# Patient Record
Sex: FEMALE | Race: BLACK OR AFRICAN AMERICAN | NOT HISPANIC OR LATINO | URBAN - METROPOLITAN AREA
[De-identification: names, ages, dates, MRNs, and addresses within clinical notes are randomized per-mention and may not be internally consistent; named-entity substitution may affect disease eponyms.]

---
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
## 2019-01-08 ENCOUNTER — APPOINTMENT (OUTPATIENT)
Dept: OTOLARYNGOLOGY | Facility: CLINIC | Age: 19
End: 2019-01-08
Payer: MEDICAID

## 2019-01-08 VITALS
SYSTOLIC BLOOD PRESSURE: 118 MMHG | HEART RATE: 84 BPM | BODY MASS INDEX: 23.19 KG/M2 | WEIGHT: 126 LBS | DIASTOLIC BLOOD PRESSURE: 79 MMHG | OXYGEN SATURATION: 98 % | HEIGHT: 62 IN

## 2019-01-08 PROCEDURE — 99214 OFFICE O/P EST MOD 30 MIN: CPT

## 2019-01-08 NOTE — HISTORY OF PRESENT ILLNESS
[de-identified] : Seen at ERs several times recently for what she reports was tonsillitis with extremely swollen tonsils. Hx of having had mono a year ago with a similar presentation. Now c/o feeling the tonsils touching. No pain now. \par Snores with c/o ongoing worsening daytime sleepiness, mouth breathing and dry mouth. In general not a lot of nasal dyspnea. Morning headaches. \par Also c/o occasional tonsilliths (roughly monthly); hasn't tried a water pik.

## 2019-01-08 NOTE — ASSESSMENT
[FreeTextEntry1] : Likely LIZET; discussed possible tonsillectomy/lingual tonsillectomy +/- adenoids but will obtain PSG first.

## 2019-01-30 ENCOUNTER — OUTPATIENT (OUTPATIENT)
Dept: OUTPATIENT SERVICES | Facility: HOSPITAL | Age: 19
LOS: 1 days | End: 2019-01-30
Payer: COMMERCIAL

## 2019-01-30 ENCOUNTER — APPOINTMENT (OUTPATIENT)
Dept: SLEEP CENTER | Facility: HOSPITAL | Age: 19
End: 2019-01-30

## 2019-01-30 DIAGNOSIS — G47.33 OBSTRUCTIVE SLEEP APNEA (ADULT) (PEDIATRIC): ICD-10-CM

## 2019-01-30 PROCEDURE — 95810 POLYSOM 6/> YRS 4/> PARAM: CPT

## 2019-01-30 PROCEDURE — 95810 POLYSOM 6/> YRS 4/> PARAM: CPT | Mod: 26

## 2019-02-11 ENCOUNTER — EMERGENCY (EMERGENCY)
Facility: HOSPITAL | Age: 19
LOS: 1 days | Discharge: HOME | End: 2019-02-11
Attending: EMERGENCY MEDICINE | Admitting: EMERGENCY MEDICINE
Payer: MEDICAID

## 2019-02-11 VITALS
TEMPERATURE: 98 F | DIASTOLIC BLOOD PRESSURE: 77 MMHG | HEART RATE: 94 BPM | OXYGEN SATURATION: 100 % | SYSTOLIC BLOOD PRESSURE: 122 MMHG | RESPIRATION RATE: 16 BRPM

## 2019-02-11 PROCEDURE — 99284 EMERGENCY DEPT VISIT MOD MDM: CPT | Mod: 25

## 2019-02-11 NOTE — ED ADULT TRIAGE NOTE - CHIEF COMPLAINT QUOTE
pt c/o generalized abdominal pain/cramping x3 days beginning after eating chipotle- denies any n/v/d, urinary, last BM this morning, appears comfortable in NAD pt c/o generalized abdominal pain/cramping x3 days beginning after eating chipotle- denies any n/v/d, urinary, last BM this morning, currently menstruating, appears comfortable in NAD

## 2019-02-12 LAB
ALBUMIN SERPL ELPH-MCNC: 4.3 G/DL — SIGNIFICANT CHANGE UP (ref 3.3–5)
ALP SERPL-CCNC: 54 U/L — SIGNIFICANT CHANGE UP (ref 40–120)
ALT FLD-CCNC: 11 U/L — SIGNIFICANT CHANGE UP (ref 4–33)
ANION GAP SERPL CALC-SCNC: 13 MMO/L — SIGNIFICANT CHANGE UP (ref 7–14)
AST SERPL-CCNC: 16 U/L — SIGNIFICANT CHANGE UP (ref 4–32)
BASOPHILS # BLD AUTO: 0.05 K/UL — SIGNIFICANT CHANGE UP (ref 0–0.2)
BASOPHILS NFR BLD AUTO: 0.6 % — SIGNIFICANT CHANGE UP (ref 0–2)
BILIRUB SERPL-MCNC: 0.4 MG/DL — SIGNIFICANT CHANGE UP (ref 0.2–1.2)
BUN SERPL-MCNC: 7 MG/DL — SIGNIFICANT CHANGE UP (ref 7–23)
CALCIUM SERPL-MCNC: 9.3 MG/DL — SIGNIFICANT CHANGE UP (ref 8.4–10.5)
CHLORIDE SERPL-SCNC: 101 MMOL/L — SIGNIFICANT CHANGE UP (ref 98–107)
CO2 SERPL-SCNC: 24 MMOL/L — SIGNIFICANT CHANGE UP (ref 22–31)
CREAT SERPL-MCNC: 0.79 MG/DL — SIGNIFICANT CHANGE UP (ref 0.5–1.3)
EOSINOPHIL # BLD AUTO: 0.09 K/UL — SIGNIFICANT CHANGE UP (ref 0–0.5)
EOSINOPHIL NFR BLD AUTO: 1.1 % — SIGNIFICANT CHANGE UP (ref 0–6)
GLUCOSE SERPL-MCNC: 100 MG/DL — HIGH (ref 70–99)
HCT VFR BLD CALC: 40.7 % — SIGNIFICANT CHANGE UP (ref 34.5–45)
HGB BLD-MCNC: 12.7 G/DL — SIGNIFICANT CHANGE UP (ref 11.5–15.5)
IMM GRANULOCYTES NFR BLD AUTO: 0.2 % — SIGNIFICANT CHANGE UP (ref 0–1.5)
LIDOCAIN IGE QN: 26.4 U/L — SIGNIFICANT CHANGE UP (ref 7–60)
LYMPHOCYTES # BLD AUTO: 3.71 K/UL — HIGH (ref 1–3.3)
LYMPHOCYTES # BLD AUTO: 45.1 % — HIGH (ref 13–44)
MCHC RBC-ENTMCNC: 27.4 PG — SIGNIFICANT CHANGE UP (ref 27–34)
MCHC RBC-ENTMCNC: 31.2 % — LOW (ref 32–36)
MCV RBC AUTO: 87.9 FL — SIGNIFICANT CHANGE UP (ref 80–100)
MONOCYTES # BLD AUTO: 0.73 K/UL — SIGNIFICANT CHANGE UP (ref 0–0.9)
MONOCYTES NFR BLD AUTO: 8.9 % — SIGNIFICANT CHANGE UP (ref 2–14)
NEUTROPHILS # BLD AUTO: 3.62 K/UL — SIGNIFICANT CHANGE UP (ref 1.8–7.4)
NEUTROPHILS NFR BLD AUTO: 44.1 % — SIGNIFICANT CHANGE UP (ref 43–77)
NRBC # FLD: 0 K/UL — LOW (ref 25–125)
PLATELET # BLD AUTO: 297 K/UL — SIGNIFICANT CHANGE UP (ref 150–400)
PMV BLD: 11.6 FL — SIGNIFICANT CHANGE UP (ref 7–13)
POTASSIUM SERPL-MCNC: 4.1 MMOL/L — SIGNIFICANT CHANGE UP (ref 3.5–5.3)
POTASSIUM SERPL-SCNC: 4.1 MMOL/L — SIGNIFICANT CHANGE UP (ref 3.5–5.3)
PROT SERPL-MCNC: 7.3 G/DL — SIGNIFICANT CHANGE UP (ref 6–8.3)
RBC # BLD: 4.63 M/UL — SIGNIFICANT CHANGE UP (ref 3.8–5.2)
RBC # FLD: 12.1 % — SIGNIFICANT CHANGE UP (ref 10.3–14.5)
SODIUM SERPL-SCNC: 138 MMOL/L — SIGNIFICANT CHANGE UP (ref 135–145)
WBC # BLD: 8.22 K/UL — SIGNIFICANT CHANGE UP (ref 3.8–10.5)
WBC # FLD AUTO: 8.22 K/UL — SIGNIFICANT CHANGE UP (ref 3.8–10.5)

## 2019-02-12 RX ORDER — ACETAMINOPHEN 500 MG
650 TABLET ORAL ONCE
Qty: 0 | Refills: 0 | Status: COMPLETED | OUTPATIENT
Start: 2019-02-12 | End: 2019-02-12

## 2019-02-12 RX ORDER — SODIUM CHLORIDE 9 MG/ML
1000 INJECTION INTRAMUSCULAR; INTRAVENOUS; SUBCUTANEOUS ONCE
Qty: 0 | Refills: 0 | Status: COMPLETED | OUTPATIENT
Start: 2019-02-12 | End: 2019-02-12

## 2019-02-12 RX ORDER — FAMOTIDINE 10 MG/ML
20 INJECTION INTRAVENOUS ONCE
Qty: 0 | Refills: 0 | Status: COMPLETED | OUTPATIENT
Start: 2019-02-12 | End: 2019-02-12

## 2019-02-12 RX ADMIN — Medication 30 MILLILITER(S): at 03:31

## 2019-02-12 RX ADMIN — Medication 650 MILLIGRAM(S): at 03:31

## 2019-02-12 RX ADMIN — SODIUM CHLORIDE 1000 MILLILITER(S): 9 INJECTION INTRAMUSCULAR; INTRAVENOUS; SUBCUTANEOUS at 03:31

## 2019-02-12 RX ADMIN — FAMOTIDINE 20 MILLIGRAM(S): 10 INJECTION INTRAVENOUS at 03:31

## 2019-02-12 NOTE — ED PROVIDER NOTE - NSFOLLOWUPINSTRUCTIONS_ED_ALL_ED_FT
Can take tylenol every 6hrs as needed for fever/pain.  Stay well hydrated.  Return for persistent fever/vomit, uncontrolled pain, worsening breathing.   Follow up with primary doctor within 1-2 days.     Abdominal Pain    Many things can cause abdominal pain. Many times, abdominal pain is not caused by a disease and will improve without treatment. Your health care provider will do a physical exam to determine if there is a dangerous cause of your pain; blood tests and imaging may help determine the cause of your pain. However, in many cases, no cause may be found and you may need further testing as an outpatient. Monitor your abdominal pain for any changes.     SEEK IMMEDIATE MEDICAL CARE IF YOU HAVE ANY OF THE FOLLOWING SYMPTOMS: worsening abdominal pain, uncontrollable vomiting, profuse diarrhea, inability to have bowel movements or pass gas, black or bloody stools, fever accompanying chest pain or back pain, or fainting. These symptoms may represent a serious problem that is an emergency. Do not wait to see if the symptoms will go away. Get medical help right away. Call 911 and do not drive yourself to the hospital.

## 2019-02-12 NOTE — ED PROVIDER NOTE - PROGRESS NOTE DETAILS
Klepfish: ucg negative. Labs grossly wnl. Feeling much better. abd soft ntnd. Given copy of results, comfortable for dc, outpt pmd f/u.

## 2019-02-12 NOTE — ED PROVIDER NOTE - ATTENDING CONTRIBUTION TO CARE
18F no PMH p/w periumbilical pain, "twisting," x3d, intermittent, began shortly after eating chipotle. Minimal nausea and 1 episode diarrhea, now resolved. Went to pmd who recommended going to ER if pain persists. PT still has intermittent pain (though currently asymptomatic) so came to ED. No other systemic symptoms. Vitals wnl, exam as above.

## 2019-02-12 NOTE — ED PROVIDER NOTE - OBJECTIVE STATEMENT
18F no PMH p/w periumbilical pain, "twisting," x3d, intermittent, began shortly after eating chipotle. Minimal nausea and 1 episode diarrhea, now resolved. Went to pmd who recommended going to ER if pain persists. PT still has intermittent pain (though currently asymptomatic) so came to ED. Denies vomiting, HA, rashes, SOB/CP, black/bloody stool, LE pain/swelling, recent travel, sick contacts. HAs not taken any pain meds.   LMP ~1.5w ago

## 2019-02-12 NOTE — ED PROVIDER NOTE - MEDICAL DECISION MAKING DETAILS
18F no PMH p/w periumbilical pain, "twisting," x3d, intermittent, began shortly after eating chipotle. Minimal nausea and 1 episode diarrhea, now resolved. Went to pmd who recommended going to ER if pain persists. PT still has intermittent pain (though currently asymptomatic) so came to ED. No other systemic symptoms. Vitals wnl, exam as above.   ddx: Clinically benign abd. Likely GERD/gastritis, less likely pancreatitis  CBC, cmp, lipase, ucg, symptom control, reassess.

## 2019-03-13 ENCOUNTER — APPOINTMENT (OUTPATIENT)
Dept: OTOLARYNGOLOGY | Facility: CLINIC | Age: 19
End: 2019-03-13
Payer: MEDICAID

## 2019-03-13 VITALS
BODY MASS INDEX: 23.19 KG/M2 | OXYGEN SATURATION: 100 % | DIASTOLIC BLOOD PRESSURE: 81 MMHG | SYSTOLIC BLOOD PRESSURE: 133 MMHG | HEART RATE: 88 BPM | TEMPERATURE: 98.3 F | WEIGHT: 126 LBS | HEIGHT: 62 IN

## 2019-03-13 DIAGNOSIS — J35.1 HYPERTROPHY OF TONSILS: ICD-10-CM

## 2019-03-13 PROCEDURE — 99214 OFFICE O/P EST MOD 30 MIN: CPT

## 2019-03-13 NOTE — HISTORY OF PRESENT ILLNESS
[de-identified] : Seen at ERs several times recently for what she reports was tonsillitis with extremely swollen tonsils; this continues to bother her while eating. \par Snores with c/o ongoing worsening daytime sleepiness, mouth breathing and dry mouth. In general not a lot of nasal dyspnea. Morning headaches. Now follows up a negative PSG. \par Also c/o occasional tonsilliths (roughly monthly); hasn't tried a water pik.

## 2019-03-13 NOTE — ASSESSMENT
[FreeTextEntry1] : Discussed her lack of LIZET on PSG; her indication for a tonsillectomy would be the tonsilliths, but she didn't try a water pik yet and agrees to do this and RTC.

## 2019-03-16 ENCOUNTER — EMERGENCY (EMERGENCY)
Facility: HOSPITAL | Age: 19
LOS: 1 days | Discharge: HOME | End: 2019-03-16
Admitting: EMERGENCY MEDICINE
Payer: MEDICAID

## 2019-03-16 VITALS
SYSTOLIC BLOOD PRESSURE: 110 MMHG | HEART RATE: 117 BPM | TEMPERATURE: 100 F | OXYGEN SATURATION: 100 % | RESPIRATION RATE: 18 BRPM | DIASTOLIC BLOOD PRESSURE: 82 MMHG

## 2019-03-16 VITALS
HEART RATE: 89 BPM | DIASTOLIC BLOOD PRESSURE: 58 MMHG | OXYGEN SATURATION: 100 % | SYSTOLIC BLOOD PRESSURE: 105 MMHG | RESPIRATION RATE: 18 BRPM

## 2019-03-16 LAB
ALBUMIN SERPL ELPH-MCNC: 4.8 G/DL — SIGNIFICANT CHANGE UP (ref 3.3–5)
ALP SERPL-CCNC: 70 U/L — SIGNIFICANT CHANGE UP (ref 40–120)
ALT FLD-CCNC: 13 U/L — SIGNIFICANT CHANGE UP (ref 4–33)
ANION GAP SERPL CALC-SCNC: 12 MMO/L — SIGNIFICANT CHANGE UP (ref 7–14)
AST SERPL-CCNC: 19 U/L — SIGNIFICANT CHANGE UP (ref 4–32)
BASOPHILS # BLD AUTO: 0.06 K/UL — SIGNIFICANT CHANGE UP (ref 0–0.2)
BASOPHILS NFR BLD AUTO: 0.5 % — SIGNIFICANT CHANGE UP (ref 0–2)
BILIRUB SERPL-MCNC: 0.5 MG/DL — SIGNIFICANT CHANGE UP (ref 0.2–1.2)
BUN SERPL-MCNC: 7 MG/DL — SIGNIFICANT CHANGE UP (ref 7–23)
CALCIUM SERPL-MCNC: 10 MG/DL — SIGNIFICANT CHANGE UP (ref 8.4–10.5)
CHLORIDE SERPL-SCNC: 100 MMOL/L — SIGNIFICANT CHANGE UP (ref 98–107)
CO2 SERPL-SCNC: 24 MMOL/L — SIGNIFICANT CHANGE UP (ref 22–31)
CREAT SERPL-MCNC: 0.83 MG/DL — SIGNIFICANT CHANGE UP (ref 0.5–1.3)
EOSINOPHIL # BLD AUTO: 0.07 K/UL — SIGNIFICANT CHANGE UP (ref 0–0.5)
EOSINOPHIL NFR BLD AUTO: 0.6 % — SIGNIFICANT CHANGE UP (ref 0–6)
FLU A RESULT: NOT DETECTED — SIGNIFICANT CHANGE UP
FLU A RESULT: NOT DETECTED — SIGNIFICANT CHANGE UP
FLUAV AG NPH QL: NOT DETECTED — SIGNIFICANT CHANGE UP
FLUBV AG NPH QL: NOT DETECTED — SIGNIFICANT CHANGE UP
GLUCOSE SERPL-MCNC: 94 MG/DL — SIGNIFICANT CHANGE UP (ref 70–99)
HCT VFR BLD CALC: 44.2 % — SIGNIFICANT CHANGE UP (ref 34.5–45)
HGB BLD-MCNC: 13.9 G/DL — SIGNIFICANT CHANGE UP (ref 11.5–15.5)
IMM GRANULOCYTES NFR BLD AUTO: 0.3 % — SIGNIFICANT CHANGE UP (ref 0–1.5)
LYMPHOCYTES # BLD AUTO: 1.2 K/UL — SIGNIFICANT CHANGE UP (ref 1–3.3)
LYMPHOCYTES # BLD AUTO: 10.7 % — LOW (ref 13–44)
MCHC RBC-ENTMCNC: 28 PG — SIGNIFICANT CHANGE UP (ref 27–34)
MCHC RBC-ENTMCNC: 31.4 % — LOW (ref 32–36)
MCV RBC AUTO: 88.9 FL — SIGNIFICANT CHANGE UP (ref 80–100)
MONOCYTES # BLD AUTO: 0.76 K/UL — SIGNIFICANT CHANGE UP (ref 0–0.9)
MONOCYTES NFR BLD AUTO: 6.8 % — SIGNIFICANT CHANGE UP (ref 2–14)
NEUTROPHILS # BLD AUTO: 9.11 K/UL — HIGH (ref 1.8–7.4)
NEUTROPHILS NFR BLD AUTO: 81.1 % — HIGH (ref 43–77)
NRBC # FLD: 0 K/UL — LOW (ref 25–125)
PLATELET # BLD AUTO: 282 K/UL — SIGNIFICANT CHANGE UP (ref 150–400)
PMV BLD: 12 FL — SIGNIFICANT CHANGE UP (ref 7–13)
POTASSIUM SERPL-MCNC: 4.4 MMOL/L — SIGNIFICANT CHANGE UP (ref 3.5–5.3)
POTASSIUM SERPL-SCNC: 4.4 MMOL/L — SIGNIFICANT CHANGE UP (ref 3.5–5.3)
PROT SERPL-MCNC: 8.2 G/DL — SIGNIFICANT CHANGE UP (ref 6–8.3)
RBC # BLD: 4.97 M/UL — SIGNIFICANT CHANGE UP (ref 3.8–5.2)
RBC # FLD: 12.3 % — SIGNIFICANT CHANGE UP (ref 10.3–14.5)
RSV RESULT: SIGNIFICANT CHANGE UP
RSV RNA RESP QL NAA+PROBE: SIGNIFICANT CHANGE UP
SODIUM SERPL-SCNC: 136 MMOL/L — SIGNIFICANT CHANGE UP (ref 135–145)
WBC # BLD: 11.23 K/UL — HIGH (ref 3.8–10.5)
WBC # FLD AUTO: 11.23 K/UL — HIGH (ref 3.8–10.5)

## 2019-03-16 PROCEDURE — 71046 X-RAY EXAM CHEST 2 VIEWS: CPT | Mod: 26

## 2019-03-16 PROCEDURE — 99284 EMERGENCY DEPT VISIT MOD MDM: CPT

## 2019-03-16 RX ORDER — ACETAMINOPHEN 500 MG
650 TABLET ORAL ONCE
Qty: 0 | Refills: 0 | Status: COMPLETED | OUTPATIENT
Start: 2019-03-16 | End: 2019-03-16

## 2019-03-16 RX ORDER — AMOXICILLIN 250 MG/5ML
1 SUSPENSION, RECONSTITUTED, ORAL (ML) ORAL
Qty: 14 | Refills: 0 | OUTPATIENT
Start: 2019-03-16 | End: 2019-03-22

## 2019-03-16 RX ORDER — SODIUM CHLORIDE 9 MG/ML
1000 INJECTION INTRAMUSCULAR; INTRAVENOUS; SUBCUTANEOUS ONCE
Qty: 0 | Refills: 0 | Status: COMPLETED | OUTPATIENT
Start: 2019-03-16 | End: 2019-03-16

## 2019-03-16 RX ORDER — KETOROLAC TROMETHAMINE 30 MG/ML
15 SYRINGE (ML) INJECTION ONCE
Qty: 0 | Refills: 0 | Status: DISCONTINUED | OUTPATIENT
Start: 2019-03-16 | End: 2019-03-16

## 2019-03-16 RX ORDER — DEXAMETHASONE 0.5 MG/5ML
10 ELIXIR ORAL ONCE
Qty: 0 | Refills: 0 | Status: COMPLETED | OUTPATIENT
Start: 2019-03-16 | End: 2019-03-16

## 2019-03-16 RX ADMIN — Medication 102 MILLIGRAM(S): at 15:05

## 2019-03-16 RX ADMIN — Medication 100 MILLIGRAM(S): at 15:06

## 2019-03-16 RX ADMIN — SODIUM CHLORIDE 1000 MILLILITER(S): 9 INJECTION INTRAMUSCULAR; INTRAVENOUS; SUBCUTANEOUS at 15:07

## 2019-03-16 RX ADMIN — SODIUM CHLORIDE 1000 MILLILITER(S): 9 INJECTION INTRAMUSCULAR; INTRAVENOUS; SUBCUTANEOUS at 17:33

## 2019-03-16 RX ADMIN — Medication 650 MILLIGRAM(S): at 15:06

## 2019-03-16 RX ADMIN — Medication 650 MILLIGRAM(S): at 15:04

## 2019-03-16 RX ADMIN — SODIUM CHLORIDE 1000 MILLILITER(S): 9 INJECTION INTRAMUSCULAR; INTRAVENOUS; SUBCUTANEOUS at 15:06

## 2019-03-16 RX ADMIN — Medication 10 MILLIGRAM(S): at 15:07

## 2019-03-16 RX ADMIN — Medication 100 MILLIGRAM(S): at 17:23

## 2019-03-16 NOTE — ED PROVIDER NOTE - PLAN OF CARE
Seen in ED for cough, likely viral upper respiratory tract infection.  Please drink plenty of fluids and follow the instructions below:   1)	If you have fever greater than 100F or generalized bodyaches then please take Tylenol 650 mg every 4 hours and Motrin 600 mg every 6 hours.   2)	If you have head congestion, sinus pressure, or nasal congestion then please take Allegra 120-180 mg every 24 hours or Zyrtec 10 mg every 24 hours.    3)	If you have throat discomfort please take Cepacol or Chloraseptic spray. Check bottle to make sure alcohol free.   4)	If you have persistent dry cough please take Delsym or Robitussin.   5)	If you have difficulty bringing up mucus please take Mucinex use as directed.   6)	Make a follow-up appointment with your primary doctor.   7)	Return to ED for any new or worsening symptoms.     Take Amoxicillin as prescribed.

## 2019-03-16 NOTE — ED PROVIDER NOTE - CARE PLAN
Principal Discharge DX:	Viral syndrome  Secondary Diagnosis:	Tonsillitis Principal Discharge DX:	Viral syndrome  Assessment and plan of treatment:	Seen in ED for cough, likely viral upper respiratory tract infection.  Please drink plenty of fluids and follow the instructions below:   1)	If you have fever greater than 100F or generalized bodyaches then please take Tylenol 650 mg every 4 hours and Motrin 600 mg every 6 hours.   2)	If you have head congestion, sinus pressure, or nasal congestion then please take Allegra 120-180 mg every 24 hours or Zyrtec 10 mg every 24 hours.    3)	If you have throat discomfort please take Cepacol or Chloraseptic spray. Check bottle to make sure alcohol free.   4)	If you have persistent dry cough please take Delsym or Robitussin.   5)	If you have difficulty bringing up mucus please take Mucinex use as directed.   6)	Make a follow-up appointment with your primary doctor.   7)	Return to ED for any new or worsening symptoms.     Take Amoxicillin as prescribed.  Secondary Diagnosis:	Tonsillitis

## 2019-03-16 NOTE — ED ADULT TRIAGE NOTE - CHIEF COMPLAINT QUOTE
pt amb to triage c/o sore throat and cough since yesterday associated w/ subjective fever/chills and body aches, denies OTC pain/cough meds prior to arrival, denies receiving flu vaccine this season, couhg noted on presentation, able to complete sentences

## 2019-03-16 NOTE — ED PROVIDER NOTE - OBJECTIVE STATEMENT
17 yo F with PMHX of recurrent tonsillitis presents to the ED today c/o sore throat, pain with swallowing, SOB, productive cough, nasal congestion, chest congestion, subjective fever and chills, that began yesterday. Denies nausea, vomiting, sob, abdominal pain, urinary symptoms, weakness, rashes.   Non smoker.

## 2019-03-16 NOTE — ED PROVIDER NOTE - THROAT FINDINGS
NO DROOLING/B\L tonsillar swelling, enlarged but non kissing, no exudates,or vesicles./THROAT RED/no exudate/NO STRIDOR/TONSILLAR SWELLING/NO TONGUE ELEVATION/uvula midline/no vesicles

## 2019-03-16 NOTE — ED PROVIDER NOTE - PROGRESS NOTE DETAILS
Pt feels improved. reports still coughing. Will treat tonsillitis with amox for 7  days as it is recurrent. Vitals improved dc home .

## 2019-03-16 NOTE — ED PROVIDER NOTE - CLINICAL SUMMARY MEDICAL DECISION MAKING FREE TEXT BOX
17 yo F with viral syndrome and tonsillitis.  Plan for labs, flu, cxr, IVF, decadron, tylenol, reassess.

## 2019-04-10 ENCOUNTER — APPOINTMENT (OUTPATIENT)
Dept: OTOLARYNGOLOGY | Facility: CLINIC | Age: 19
End: 2019-04-10
Payer: MEDICAID

## 2019-04-10 VITALS
SYSTOLIC BLOOD PRESSURE: 120 MMHG | OXYGEN SATURATION: 100 % | HEIGHT: 62 IN | HEART RATE: 92 BPM | DIASTOLIC BLOOD PRESSURE: 79 MMHG | WEIGHT: 126 LBS | BODY MASS INDEX: 23.19 KG/M2 | TEMPERATURE: 98.7 F

## 2019-04-10 PROCEDURE — 99214 OFFICE O/P EST MOD 30 MIN: CPT

## 2019-04-10 NOTE — HISTORY OF PRESENT ILLNESS
[de-identified] : f/u for episodes of tonsillitis with extremely swollen tonsils; this continues to bother her while eating. After reviewing she and her GM feel that she has had three/year for many years. \par Snores with c/o ongoing worsening daytime sleepiness, mouth breathing and dry mouth. In general not a lot of nasal dyspnea. Morning headaches. Had a negative PSG. \par Also c/o occasional tonsilliths (roughly monthly).

## 2019-05-01 ENCOUNTER — APPOINTMENT (OUTPATIENT)
Dept: OTOLARYNGOLOGY | Facility: CLINIC | Age: 19
End: 2019-05-01
Payer: MEDICAID

## 2019-05-01 VITALS
TEMPERATURE: 98 F | HEART RATE: 99 BPM | SYSTOLIC BLOOD PRESSURE: 118 MMHG | BODY MASS INDEX: 23.19 KG/M2 | DIASTOLIC BLOOD PRESSURE: 77 MMHG | HEIGHT: 62 IN | WEIGHT: 126 LBS | OXYGEN SATURATION: 98 %

## 2019-05-01 PROCEDURE — 99215 OFFICE O/P EST HI 40 MIN: CPT

## 2019-05-01 RX ORDER — HYDROCODONE BITARTRATE AND ACETAMINOPHEN 5; 325 MG/1; MG/1
5-325 TABLET ORAL
Qty: 30 | Refills: 0 | Status: ACTIVE | COMMUNITY
Start: 2019-05-01 | End: 1900-01-01

## 2019-05-01 RX ORDER — AMOXICILLIN 400 MG/5ML
400 FOR SUSPENSION ORAL
Qty: 3 | Refills: 0 | Status: ACTIVE | COMMUNITY
Start: 2019-05-01 | End: 1900-01-01

## 2019-05-01 NOTE — HISTORY OF PRESENT ILLNESS
[de-identified] : f/u for episodes of tonsillitis with extremely swollen tonsils; this continues to bother her while eating. After reviewing she and her GM feel that she has had three/year for many years. \par Snores with c/o ongoing worsening daytime sleepiness, mouth breathing and dry mouth. In general not a lot of nasal dyspnea. Morning headaches. Had a negative PSG. \par Also c/o occasional tonsilliths (roughly monthly).

## 2019-05-01 NOTE — ASSESSMENT
[FreeTextEntry1] : Preop done for tonsillectomy. The risks and benefits were fully reviewed including but not limited to: postoperative hemorrhage; velopharyngeal insufficiency; swallowing or speaking problems; asymmetric appearing palate. The patient would like to proceed. An educational perioperative care handout was given.\par  check negative\par

## 2019-05-03 ENCOUNTER — RESULT REVIEW (OUTPATIENT)
Age: 19
End: 2019-05-03

## 2019-05-03 ENCOUNTER — APPOINTMENT (OUTPATIENT)
Dept: OTOLARYNGOLOGY | Facility: AMBULATORY SURGERY CENTER | Age: 19
End: 2019-05-03

## 2019-05-03 ENCOUNTER — OUTPATIENT (OUTPATIENT)
Dept: OUTPATIENT SERVICES | Facility: HOSPITAL | Age: 19
LOS: 1 days | Discharge: HOME | End: 2019-05-03
Payer: MEDICAID

## 2019-05-03 PROCEDURE — 42826 REMOVAL OF TONSILS: CPT

## 2019-05-11 LAB — SURGICAL PATHOLOGY STUDY: SIGNIFICANT CHANGE UP

## 2019-05-30 ENCOUNTER — APPOINTMENT (OUTPATIENT)
Dept: OTOLARYNGOLOGY | Facility: CLINIC | Age: 19
End: 2019-05-30
Payer: MEDICAID

## 2019-05-30 VITALS
WEIGHT: 126 LBS | HEIGHT: 62 IN | HEART RATE: 100 BPM | BODY MASS INDEX: 23.19 KG/M2 | TEMPERATURE: 98.4 F | SYSTOLIC BLOOD PRESSURE: 125 MMHG | OXYGEN SATURATION: 98 % | DIASTOLIC BLOOD PRESSURE: 80 MMHG

## 2019-05-30 DIAGNOSIS — Z87.09 PERSONAL HISTORY OF OTHER DISEASES OF THE RESPIRATORY SYSTEM: ICD-10-CM

## 2019-05-30 DIAGNOSIS — J35.1 HYPERTROPHY OF TONSILS: ICD-10-CM

## 2019-05-30 DIAGNOSIS — J35.8 OTHER CHRONIC DISEASES OF TONSILS AND ADENOIDS: ICD-10-CM

## 2019-05-30 DIAGNOSIS — Z87.898 PERSONAL HISTORY OF OTHER SPECIFIED CONDITIONS: ICD-10-CM

## 2019-05-30 PROCEDURE — 99024 POSTOP FOLLOW-UP VISIT: CPT

## 2019-06-03 PROBLEM — Z87.898 HISTORY OF SNORING: Status: RESOLVED | Noted: 2018-12-05 | Resolved: 2019-06-03

## 2019-06-03 PROBLEM — J35.8 TONSILLITH: Status: RESOLVED | Noted: 2018-12-05 | Resolved: 2019-06-03

## 2019-06-03 PROBLEM — J35.1 PALATINE TONSIL HYPERTROPHY: Status: RESOLVED | Noted: 2018-12-05 | Resolved: 2019-06-03

## 2019-06-03 PROBLEM — Z87.09 HISTORY OF RECURRENT TONSILLITIS: Status: RESOLVED | Noted: 2019-04-10 | Resolved: 2019-06-03

## 2020-09-08 NOTE — ED PEDIATRIC TRIAGE NOTE - PAIN RATING/NUMBER SCALE (0-10): ACTIVITY
Mika Montemayor is a 57 year old female presenting with follow-up.    Medication verified and med list updated  Denies known Latex allergy or symptoms of Latex sensitivity  Refills needed today?  Yes         Tobacco use verified.  Pt is non smoker.    Patient would like communication of their results via:  Send letter with results.  If need to speak with pt, call     Cell Phone:   Telephone Information:   Mobile [phone]     Okay to leave a message containing results? Yes    Health Maintenance Due   Topic Date Due   • DTaP/Tdap/Td Vaccine (1 - Tdap) 11/03/1980   • Colorectal Cancer Screening-Colonoscopy  11/03/2011   • Shingles Vaccine (1 of 2) 11/03/2011   • Breast Cancer Screening  11/03/2016   • Influenza Vaccine (1) 09/01/2020      6

## 2021-01-13 NOTE — ED PROVIDER NOTE - CROS ED CARDIOVAS ALL NEG
Problem: Adult Inpatient Plan of Care  Goal: Plan of Care Review  Recent Flowsheet Documentation  Taken 1/13/2021 1551 by Ana Laura Carrion LPN  Progress: no change  Plan of Care Reviewed With: patient  Outcome Summary: Patient had EGD this afternoon, tolerated well with minimal complaints of discomfort. Pt is resting in bed. Will continue to monitor.   Goal Outcome Evaluation:            - - -

## 2021-08-19 NOTE — ED ADULT TRIAGE NOTE - LOCATION:
Left arm; 14 y/o single, AA, female, domiciled with family, rising 10th grader, with no formal past psych diagnosis, no inpatient admissions, no known suicide attempts, remote history of brief therapy sessions last year, who presented to [facility] for telehealth appointment set up by mother as she left home for several hours 2 days ago.     Pt viewed on screen, is calm but not very forthcoming. She states two days ago, she had lunch with her mom and friend at a seafood restaurant and then after coming home she "just walked out". She states there was no triggering event, nothing bad happened, there was no fight with anyone, and she just "wanted to be alone". 14 y/o single, AA, female, domiciled with family, rising 10th grader, with no formal past psych diagnosis, no inpatient admissions, no known suicide attempts, remote history of brief therapy sessions last year, who presented to [facility] for telehealth appointment set up by mother as she left home for several hours 2 days ago.     Pt viewed on screen, is calm and cooperative but not very forthcoming. She states two days ago, she had lunch with her mom and friend at a seafood restaurant and then after coming home she "just walked out". She states there was no triggering event, nothing bad happened, there was no fight with anyone, and she just "wanted to be alone". She states she doesn't know why she left the house, rather than finding a room or another safe area where she could be alone. She states she did this once before, in Nov, after "something" happened but states "I don't want to talk about it". Although she is not forthcoming about the details, she is able to tell writer that it was not something that put her in danger, or anything related to physical or sexual abuse. This time, however, she states nothing happened but she still left the house. She states she was in touch with her cousin, to whom she revealed her location and was ultimately picked up by family. When asked how she felt being back home, she states "relieved". She is able to recognize some bad things that could have happened, such as "I could have been sold into sex slavery". She states she has been feeling down, states her sleep is "okay" but appetite is poor and energy is low. She states she has passive SI, but denies any active SI , intent or plan and states she is able to talk to her friends, cousin or dance to distract herself. She states she feels safe at home. Otherwise, denies sxs suggestive of anxiety, psychosis or gris.  Wishes to be a pediatrician when she is older.     Mother: Mother reports last time she ran away was in Nov, after she took her phone away for having snap chat. Since then, mom has given her limited access to phone and she is not allowed to be on social media, which could be why Huong is acting like this. Mom does not have any safety concerns and does not think Huong would hurt herself. Mom states this time when she ran away, there was no inciting event. Mom states Huong was texting and facetiming her while away from home, was out for 4 hours, and ultimately told her cousin where she was. 14 y/o single, AA, female, domiciled with family, rising 10th grader, with no formal past psych diagnosis, no inpatient admissions, no known suicide attempts, remote history of brief therapy sessions last year, who presented to [facility] for telehealth appointment set up by mother as she left home for several hours 2 days ago.     Pt viewed on screen, is calm and cooperative but not very forthcoming. She states two days ago, she had lunch with her mom and friend at a seafood restaurant and then after coming home she "just walked out". She states there was no triggering event, nothing bad happened, there was no fight with anyone, and she just "wanted to be alone". She states she doesn't know why she left the house, rather than finding a room or another safe area where she could be alone. She states she did this once before, in Nov, after "something" happened but states "I don't want to talk about it". Although she is not forthcoming about the details, she is able to tell writer that it was not something that put her in danger, or anything related to physical or sexual abuse. This time, however, she states nothing happened but she still left the house. She states she was in touch with her cousin, to whom she revealed her location and was ultimately picked up by family. When asked how she felt being back home, she states "relieved". She is able to recognize some bad things that could have happened, such as "I could have been sold into sex slavery". She states she has been feeling down, states her sleep is "okay" but appetite is poor and energy is low. She states she has intermittent passive SI, but denies any active SI , intent or plan and states she is able to talk to her friends, cousin or dance to distract herself. She states she feels safe at home. Otherwise, denies sxs suggestive of anxiety, psychosis or gris.  Wishes to be a pediatrician when she is older.     Mother: Mother reports last time she ran away was in Nov, after she took her phone away for having snap chat. Since then, mom has given her limited access to phone and she is not allowed to be on social media, which could be why Huong is acting like this. Mom does not have any safety concerns and does not think Huong would hurt herself. Mom states this time when she ran away, there was no inciting event. Mom states Huong was texting and facetiming her while away from home, was out for 4 hours, and ultimately told her cousin where she was.

## 2022-03-11 NOTE — ED PROVIDER NOTE - CONSTITUTIONAL NEGATIVE STATEMENT, MLM
No new care gaps identified.  Powered by [employer]. Reference number: [id number].   3/11/2022 11:42:40 AM CST   no fever

## 2023-12-02 NOTE — ED PROVIDER NOTE - CPE EDP GASTRO NORM
Patient called in this morning requesting something for pain.  Noting that she was offered pain medication yesterday and declined.  She gave an address of [facility] [address]. in [place], [place].  We will electronically sent in Motrin 600 and Flexeril for pain.   normal...

## 2024-04-02 NOTE — ED PROVIDER NOTE - DATE/TIME 1
Patient can be scheduled for Establish care consult visit.  Faxed request to Medical records at [facility].  [phone]  For imaging , EGD, labs in past 12 months   15-May-2017 17:12

## 2024-07-08 NOTE — ED PROVIDER NOTE - NEUROLOGICAL, MLM
Detail Level: Detailed
Detail Level: Simple
Detail Level: Generalized
Detail Level: Zone
Alert and oriented, no focal deficits, no motor or sensory deficits.